# Patient Record
Sex: FEMALE | Race: WHITE | URBAN - METROPOLITAN AREA
[De-identification: names, ages, dates, MRNs, and addresses within clinical notes are randomized per-mention and may not be internally consistent; named-entity substitution may affect disease eponyms.]

---

## 2017-11-15 LAB
ABO/RH: NORMAL
ANTIBODY SCREEN: NORMAL
HEPATITIS C ANTIBODY INTERPRETATION: NORMAL

## 2017-11-16 LAB
BASOPHILS ABSOLUTE: 0 K/UL (ref 0–0.2)
BASOPHILS RELATIVE PERCENT: 0.2 %
EOSINOPHILS ABSOLUTE: 0.1 K/UL (ref 0–0.6)
EOSINOPHILS RELATIVE PERCENT: 1 %
HCT VFR BLD CALC: 41.5 % (ref 36–48)
HEMOGLOBIN: 13.8 G/DL (ref 12–16)
HEPATITIS B SURFACE ANTIGEN INTERPRETATION: ABNORMAL
HIV-1 AND HIV-2 ANTIBODIES: NORMAL
LYMPHOCYTES ABSOLUTE: 2.3 K/UL (ref 1–5.1)
LYMPHOCYTES RELATIVE PERCENT: 18.9 %
MCH RBC QN AUTO: 29.8 PG (ref 26–34)
MCHC RBC AUTO-ENTMCNC: 33.2 G/DL (ref 31–36)
MCV RBC AUTO: 89.7 FL (ref 80–100)
MONOCYTES ABSOLUTE: 0.6 K/UL (ref 0–1.3)
MONOCYTES RELATIVE PERCENT: 4.9 %
NEUTROPHILS ABSOLUTE: 9.1 K/UL (ref 1.7–7.7)
NEUTROPHILS RELATIVE PERCENT: 75 %
PDW BLD-RTO: 12.9 % (ref 12.4–15.4)
PLATELET # BLD: 228 K/UL (ref 135–450)
PMV BLD AUTO: 8.5 FL (ref 5–10.5)
RBC # BLD: 4.63 M/UL (ref 4–5.2)
RPR: ABNORMAL
RUBELLA ANTIBODY IGG: 6.6 IU/ML
WBC # BLD: 12.1 K/UL (ref 4–11)

## 2017-11-17 LAB — PARVOVIRUS B19 IGG ANTIBODY: 4.75 IV

## 2018-02-13 LAB
GLUCOSE CHALLENGE: 93 MG/DL
HCT VFR BLD CALC: 34.6 % (ref 36–48)
HEMOGLOBIN: 11.9 G/DL (ref 12–16)
MCH RBC QN AUTO: 30.2 PG (ref 26–34)
MCHC RBC AUTO-ENTMCNC: 34.4 G/DL (ref 31–36)
MCV RBC AUTO: 87.9 FL (ref 80–100)
PDW BLD-RTO: 14.1 % (ref 12.4–15.4)
PLATELET # BLD: 186 K/UL (ref 135–450)
PMV BLD AUTO: 7.9 FL (ref 5–10.5)
RBC # BLD: 3.94 M/UL (ref 4–5.2)
WBC # BLD: 11.1 K/UL (ref 4–11)

## 2018-05-30 PROBLEM — O34.219 PREVIOUS CESAREAN DELIVERY AFFECTING PREGNANCY: Status: ACTIVE | Noted: 2018-05-30

## 2019-04-03 ENCOUNTER — TELEPHONE (OUTPATIENT)
Dept: DERMATOLOGY | Age: 34
End: 2019-04-03

## 2019-04-03 NOTE — TELEPHONE ENCOUNTER
The patient is calling for a new pt appt with Dr. Korin Sanchez. She was offered an appointment with an associate, Dr. Jeny Early or Dr. Simon Casey. She declined. Her parents in law are pts of Dr. Korin Sanchez and they consider themselves all in the same family and should be able to be scheduled. Shawn and Anh Garcia are her family members. I advised there are no new patient appointments readily available at this time but she still wanted me to check with Dr. Korin Sanchez to see if she chould be seen? She wants to be established and get a skin exam.  She also is calling on behalf of getting her  scheduled too. Best call back number [phone].

## 2019-04-04 NOTE — TELEPHONE ENCOUNTER
Please offer them 5/20/19 @ 8:30am for one of them and 6/18/19 @ 8:30am for the other. Both appts are for Dr. Sergio Jennings.

## 2019-04-18 NOTE — TELEPHONE ENCOUNTER
I left a message for the patient, Samuella Heimlich, to call me back and I will schedule her and her  with Dr. Leif Soriano.

## 2019-05-20 ENCOUNTER — OFFICE VISIT (OUTPATIENT)
Dept: DERMATOLOGY | Age: 34
End: 2019-05-20
Payer: COMMERCIAL

## 2019-05-20 DIAGNOSIS — L71.9 ROSACEA: ICD-10-CM

## 2019-05-20 DIAGNOSIS — D22.9 MULTIPLE NEVI: Primary | ICD-10-CM

## 2019-05-20 DIAGNOSIS — D23.72 DERMATOFIBROMA OF LEFT LOWER EXTREMITY: ICD-10-CM

## 2019-05-20 PROCEDURE — 99213 OFFICE O/P EST LOW 20 MIN: CPT | Performed by: DERMATOLOGY

## 2019-05-20 NOTE — PROGRESS NOTES
[facility]  Aurora Yanez MD  [address]  1985    27 y.o. female     Date of Visit: 2019    Chief Complaint: skin moles. History of Present Illness:    1. She presents today for multiple nevi on the trunk and extremities - not aware of any changes in size, color or shape. 2.  Unknown duration of waxing and waning facial redness. 3.  She complains of a sometimes pruritic lesion on the left leg. Has fair skin. Had sunburns growing up. Did tanning bed in HS. Review of Systems:  Skin: No rash. Past Medical History, Family History, Surgical History, Medications and Allergies reviewed. 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 Physical Examination       The following were examined and determined to be normal: Psych/Neuro, Scalp/hair, Conjunctivae/eyelids, Gums/teeth/lips, Neck, Breast/axilla/chest, Abdomen, Back, RUE, LUE, RLE, LLE and Nails/digits. The following were examined and determined to be abnormal: Head/face. Well appearing. 1.  Trunk and extremities with multiple well defined round to oval smooth brown macules and papules. 2.  Central face, chin - mild erythema and several telangiectasias. 3.  Left lower shin - round indurated pink brown papule. Assessment and Plan     1.  Multiple nevi - benign appearing    Nikko Kane protective behaviors and self skin examinations were encouraged. Call for any new or concerning lesions. 2. Rosacea - mild erythematotelangiectatic     Continue sun protection. 3. Dermatofibroma of left lower leg -     Reassurance. Return in about 1 year (around 5/20/2020).

## 2019-10-30 LAB
CANDIDA SPECIES, DNA PROBE: ABNORMAL
GARDNERELLA VAGINALIS, DNA PROBE: ABNORMAL
TRICHOMONAS VAGINALIS DNA: ABNORMAL

## 2019-11-01 LAB
ORGANISM: ABNORMAL
URINE CULTURE, ROUTINE: ABNORMAL

## 2020-01-23 ENCOUNTER — NURSE TRIAGE (OUTPATIENT)
Dept: OTHER | Facility: CLINIC | Age: 35
End: 2020-01-23

## 2020-01-24 LAB
A/G RATIO: 1.5 (ref 1.1–2.2)
ALBUMIN SERPL-MCNC: 4.5 G/DL (ref 3.4–5)
ALP BLD-CCNC: 52 U/L (ref 40–129)
ALT SERPL-CCNC: 19 U/L (ref 10–40)
ANION GAP SERPL CALCULATED.3IONS-SCNC: 15 MMOL/L (ref 3–16)
AST SERPL-CCNC: 18 U/L (ref 15–37)
BILIRUB SERPL-MCNC: 0.5 MG/DL (ref 0–1)
BUN BLDV-MCNC: 11 MG/DL (ref 7–20)
CALCIUM SERPL-MCNC: 9.1 MG/DL (ref 8.3–10.6)
CHLORIDE BLD-SCNC: 104 MMOL/L (ref 99–110)
CHOLESTEROL, TOTAL: 149 MG/DL (ref 0–199)
CO2: 22 MMOL/L (ref 21–32)
CREAT SERPL-MCNC: 0.7 MG/DL (ref 0.6–1.1)
GFR AFRICAN AMERICAN: >60
GFR NON-AFRICAN AMERICAN: >60
GLOBULIN: 3 G/DL
GLUCOSE BLD-MCNC: 83 MG/DL (ref 70–99)
HCT VFR BLD CALC: 42.5 % (ref 36–48)
HDLC SERPL-MCNC: 76 MG/DL (ref 40–60)
HEMOGLOBIN: 13.5 G/DL (ref 12–16)
LDL CHOLESTEROL CALCULATED: 63 MG/DL
MCH RBC QN AUTO: 28.1 PG (ref 26–34)
MCHC RBC AUTO-ENTMCNC: 31.8 G/DL (ref 31–36)
MCV RBC AUTO: 88.2 FL (ref 80–100)
PDW BLD-RTO: 14.2 % (ref 12.4–15.4)
PLATELET # BLD: 222 K/UL (ref 135–450)
PMV BLD AUTO: 9.5 FL (ref 5–10.5)
POTASSIUM SERPL-SCNC: 4.3 MMOL/L (ref 3.5–5.1)
RBC # BLD: 4.82 M/UL (ref 4–5.2)
SODIUM BLD-SCNC: 141 MMOL/L (ref 136–145)
TOTAL PROTEIN: 7.5 G/DL (ref 6.4–8.2)
TRIGL SERPL-MCNC: 48 MG/DL (ref 0–150)
TSH SERPL DL<=0.05 MIU/L-ACNC: 1.9 UIU/ML (ref 0.27–4.2)
VITAMIN D 25-HYDROXY: 31 NG/ML
VLDLC SERPL CALC-MCNC: 10 MG/DL
WBC # BLD: 4.7 K/UL (ref 4–11)

## 2020-01-25 LAB
ESTIMATED AVERAGE GLUCOSE: 96.8 MG/DL
HBA1C MFR BLD: 5 %

## 2020-01-29 ENCOUNTER — OFFICE VISIT (OUTPATIENT)
Dept: FAMILY MEDICINE CLINIC | Age: 35
End: 2020-01-29
Payer: COMMERCIAL

## 2020-01-29 VITALS
WEIGHT: 157 LBS | DIASTOLIC BLOOD PRESSURE: 82 MMHG | BODY MASS INDEX: 26.16 KG/M2 | HEIGHT: 65 IN | OXYGEN SATURATION: 98 % | SYSTOLIC BLOOD PRESSURE: 128 MMHG | HEART RATE: 80 BPM

## 2020-01-29 PROCEDURE — 90471 IMMUNIZATION ADMIN: CPT | Performed by: NURSE PRACTITIONER

## 2020-01-29 PROCEDURE — 99385 PREV VISIT NEW AGE 18-39: CPT | Performed by: NURSE PRACTITIONER

## 2020-01-29 PROCEDURE — 90686 IIV4 VACC NO PRSV 0.5 ML IM: CPT | Performed by: NURSE PRACTITIONER

## 2020-01-29 ASSESSMENT — ENCOUNTER SYMPTOMS
NAUSEA: 0
BACK PAIN: 0
CHEST TIGHTNESS: 0
VOMITING: 0
BLOOD IN STOOL: 0
ABDOMINAL PAIN: 0
DIARRHEA: 0
CONSTIPATION: 0
WHEEZING: 0
SHORTNESS OF BREATH: 0
COLOR CHANGE: 0

## 2020-01-29 NOTE — PATIENT INSTRUCTIONS
possible drug interactions are listed here. Where can I get more information? Your doctor or pharmacist can provide more information about this vaccine. Additional information is available from your local health department or the Centers for Disease Control and Prevention. Remember, keep this and all other medicines out of the reach of children, never share your medicines with others, and use this medication only for the indication prescribed. Every effort has been made to ensure that the information provided by [facility]  is accurate, up-to-date, and complete, but no guarantee is made to that effect. Drug information contained herein may be time sensitive. [facility] information has been compiled for use by healthcare practitioners and consumers in the [place] and therefore [facility] does not warrant that uses outside of the [place] are appropriate, unless specifically indicated otherwise. [facility]'s drug information does not endorse drugs, diagnose patients or recommend therapy. [facility] drug information is an informational resource designed to assist licensed healthcare practitioners in caring for their patients and/or to serve consumers viewing this service as a supplement to, and not a substitute for, the expertise, skill, knowledge and judgment of healthcare practitioners. The absence of a warning for a given drug or drug combination in no way should be construed to indicate that the drug or drug combination is safe, effective or appropriate for any given patient. [facility] does not assume any responsibility for any aspect of healthcare administered with the aid of information [facility] provides. The information contained herein is not intended to cover all possible uses, directions, precautions, warnings, drug interactions, allergic reactions, or adverse effects.  If you have questions about the drugs you are taking, check with your doctor, nurse or pharmacist.  Copyright 5570-5080 [employer] Version: 7.14. Revision date: 7/10/2019. Care instructions adapted under license by [facility] ([facility]). If you have questions about a medical condition or this instruction, always ask your healthcare professional. [address] any warranty or liability for your use of this information.

## 2020-01-29 NOTE — PROGRESS NOTES
Trino Henao  : 1985  Encounter date: 2020    This sebastian 27 y.o. female who presents with  Chief Complaint   Patient presents with    Other     New patient to establish. Would like to discuss some toe numbness. History of present illness:    HPI Pt is 27year old female to establish care and preventative exam.  Pt denies current concerns. Pt received blood study at GYN office, reviewed labs with patient. History and Physical      Trino Henao  YOB: 1985    Date of Service:  2020    Chief Complaint:   Trino Henao is a 27 y.o. female who  presents for physical examination. HPI: Pt is 27year old female for preventative care.     Wt Readings from Last 3 Encounters:   20 157 lb (71.2 kg)   18 170 lb (77.1 kg)   12/04/15 144 lb 6 oz (65.5 kg)     BP Readings from Last 3 Encounters:   20 128/82   18 123/77   12/04/15 124/84       Patient Active Problem List   Diagnosis    Breech presentation    Previous  delivery affecting pregnancy       Preventive Care:  Health Maintenance   Topic Date Due    Varicella Vaccine (1 of 2 - 2-dose childhood series) 1985    Flu vaccine (1) 2019    Cervical cancer screen  07/10/2023    DTaP/Tdap/Td vaccine (2 - Td) 2028    HIV screen  Completed    Pneumococcal 0-64 years Vaccine  Aged Out      Hx abnormal PAP: no  Sexual activity: single partner, no contraception  Self-breast exams: no, advised  Previous DEXA scan: no  Last eye exam: 2019  Exercise: 3 x times week, cardio  Lipid panel:   Lab Results   Component Value Date    CHOL 149 2020    TRIG 48 2020    HDL 76 (H) 2020    LDLCALC 63 2020        Living will:No    Immunization History   Administered Date(s) Administered    Influenza Virus Vaccine 10/11/2013    MMR 2018    Tdap (Boostrix, Adacel) 2018       No Known Allergies  No outpatient medications have been marked as taking for the 20 encounter (Office Visit) with SHEEBA Guillermo NP. Past Medical History:   Diagnosis Date    Infertility, female      Past Surgical History:   Procedure Laterality Date     SECTION, LOW TRANSVERSE  2014    WISDOM TOOTH EXTRACTION       Family History   Problem Relation Age of Onset    Early Death Father     Cancer Maternal Grandmother     Cancer Maternal Grandfather     Cancer Paternal Grandfather      Social History     Socioeconomic History    Marital status:      Spouse name: Not on file    Number of children: Not on file    Years of education: Not on file    Highest education level: Not on file   Occupational History    Not on file   Social Needs    Financial resource strain: Not on file    Food insecurity:     Worry: Not on file     Inability: Not on file    Transportation needs:     Medical: Not on file     Non-medical: Not on file   Tobacco Use    Smoking status: Never Smoker    Smokeless tobacco: Never Used   Substance and Sexual Activity    Alcohol use: No    Drug use: No    Sexual activity: Not on file   Lifestyle    Physical activity:     Days per week: Not on file     Minutes per session: Not on file    Stress: Not on file   Relationships    Social connections:     Talks on phone: Not on file     Gets together: Not on file     Attends [employer] service: Not on file     Active member of club or organization: Not on file     Attends meetings of clubs or organizations: Not on file     Relationship status: Not on file    Intimate partner violence:     Fear of current or ex partner: Not on file     Emotionally abused: Not on file     Physically abused: Not on file     Forced sexual activity: Not on file   Other Topics Concern    Not on file   Social History Narrative    Not on file       Review of Systems:  A comprehensive review of systems was negative.       Physical Exam:   Vitals:    20 0911 20 0937   BP: 138/82 128/82 Site: Right Upper Arm    Position: Sitting    Cuff Size: Medium Adult    Pulse: 80    SpO2: 98%    Weight: 157 lb (71.2 kg)    Height: 5' 5\" (1.651 m)      Body mass index is 26.13 kg/m². Constitutional: She is oriented to person, place, and time. She appears well-developed and well-nourished. No distress. HEENT:   Head: Normocephalic and atraumatic. Right Ear: Tympanic membrane, external ear and ear canal normal.   Left Ear: Tympanic membrane, external ear and ear canal normal.   Nose: Nose normal.   Mouth/Throat: Oropharynx is clear and moist, and mucous membranes are normal.  There is no cervical adenopathy. Eyes: Conjunctivae and extraocular motions are normal. Pupils are equal, round, and reactive to light. Neck: Neck supple. No JVD present. Carotid bruit is not present. No mass and no thyromegaly present. Cardiovascular: Normal rate, regular rhythm, normal heart sounds and intact distal pulses. Exam reveals no gallop and no friction rub. No murmur heard. Pulmonary/Chest: Effort normal and breath sounds normal. No respiratory distress. She has no wheezes, rhonchi or rales. Abdominal: Soft, non-tender. Bowel sounds and aorta are normal. She exhibits no organomegaly, mass or bruit. Breast exam:  Not examined. Musculoskeletal: Normal range of motion, no synovitis. She exhibits no edema. Neurological: She is alert and oriented to person, place, and time. She has normal reflexes. No cranial nerve deficit. Coordination normal.   Skin: Skin is warm and dry. There is no rash or erythema. No suspicious lesions noted. Psychiatric: She has a normal mood and affect. Her speech is normal and behavior is normal. Judgment, cognition and memory are normal.     Assessment/Plan:    Margaret was seen today for other.     Diagnoses and all orders for this visit:    Encounter for preventative adult health care examination    Need for prophylactic vaccination and inoculation against influenza  - INFLUENZA, QUADV, 3 YRS AND OLDER, IM PF, PREFILL SYR OR SDV, 0.5ML (AFLURIA QUADV, PF)          No current outpatient medications on file prior to visit. No current facility-administered medications on file prior to visit. No Known Allergies  Past Medical History:   Diagnosis Date    Infertility, female       Past Surgical History:   Procedure Laterality Date     SECTION, LOW TRANSVERSE  2014    WISDOM TOOTH EXTRACTION        Family History   Problem Relation Age of Onset    Early Death Father     Cancer Maternal Grandmother     Cancer Maternal Grandfather     Cancer Paternal Grandfather       Social History     Tobacco Use    Smoking status: Never Smoker    Smokeless tobacco: Never Used   Substance Use Topics    Alcohol use: No        Review of Systems   Constitutional: Negative for activity change, appetite change, fatigue and unexpected weight change. HENT: Negative for dental problem, hearing loss and tinnitus. Eyes: Negative for visual disturbance. Respiratory: Negative for chest tightness, shortness of breath and wheezing. Cardiovascular: Negative for chest pain, palpitations and leg swelling. Gastrointestinal: Negative for abdominal pain, blood in stool, constipation, diarrhea, nausea and vomiting. Endocrine: Negative for cold intolerance, heat intolerance, polydipsia, polyphagia and polyuria. Genitourinary: Negative for decreased urine volume, difficulty urinating and frequency. Musculoskeletal: Negative for arthralgias, back pain and myalgias. Skin: Negative for color change and rash. Allergic/Immunologic: Negative for environmental allergies and food allergies. Neurological: Positive for numbness (L great toe, chronic). Negative for seizures, weakness and headaches. Hematological: Does not bruise/bleed easily. Psychiatric/Behavioral: Negative for decreased concentration, dysphoric mood and sleep disturbance. The patient is not nervous/anxious. Objective:    /82   Pulse 80   Ht 5' 5\" (1.651 m)   Wt 157 lb (71.2 kg)   SpO2 98%   BMI 26.13 kg/m²   Weight: 157 lb (71.2 kg)     BP Readings from Last 3 Encounters:   01/29/20 128/82   06/01/18 123/77   12/04/15 124/84     Wt Readings from Last 3 Encounters:   01/29/20 157 lb (71.2 kg)   05/30/18 170 lb (77.1 kg)   12/04/15 144 lb 6 oz (65.5 kg)     BMI Readings from Last 3 Encounters:   01/29/20 26.13 kg/m²   05/30/18 28.29 kg/m²   12/04/15 24.03 kg/m²       Physical Exam    Assessment/Plan    1. Encounter for preventative adult health care examination  Advised healthy eating and exercise  Discussed stretches    2. Need for prophylactic vaccination and inoculation against influenza  Administered  - INFLUENZA, QUADV, 3 YRS AND OLDER, IM PF, PREFILL SYR OR SDV, 0.5ML (MARLINE Chi)      Return in about 1 year (around 1/29/2021) for annual check up. This dictation was generated by voice recognition computer software. Although all attempts are made to edit the dictation for accuracy, there may be errors in the transcription that are not intended.

## 2020-01-29 NOTE — PROGRESS NOTES
Vaccine Information Sheet, \"Influenza - Inactivated\"  given to Nataliia Jackson, or parent/legal guardian of  Nataliia Jackson and verbalized understanding. Patient responses:    Have you ever had a reaction to a flu vaccine? No  Do you have any current illness? No  Have you ever had Guillian [place] Syndrome? No  Do you have a serious allergy to any of the follow: Neomycin, Polymyxin, Thimerosal, eggs or egg products? No    Flu vaccine given per order. Please see immunization tab. Risks and benefits explained. Current VIS given.

## 2021-03-23 ENCOUNTER — OFFICE VISIT (OUTPATIENT)
Dept: DERMATOLOGY | Age: 36
End: 2021-03-23
Payer: COMMERCIAL

## 2021-03-23 VITALS — TEMPERATURE: 98 F

## 2021-03-23 DIAGNOSIS — D22.9 MULTIPLE NEVI: Primary | ICD-10-CM

## 2021-03-23 DIAGNOSIS — D48.5 NEOPLASM OF UNCERTAIN BEHAVIOR OF SKIN: ICD-10-CM

## 2021-03-23 DIAGNOSIS — L30.9 DERMATITIS: ICD-10-CM

## 2021-03-23 DIAGNOSIS — L71.9 ROSACEA: ICD-10-CM

## 2021-03-23 DIAGNOSIS — L82.1 SK (SEBORRHEIC KERATOSIS): ICD-10-CM

## 2021-03-23 PROCEDURE — 11102 TANGNTL BX SKIN SINGLE LES: CPT | Performed by: DERMATOLOGY

## 2021-03-23 PROCEDURE — 99213 OFFICE O/P EST LOW 20 MIN: CPT | Performed by: DERMATOLOGY

## 2021-03-23 RX ORDER — TRIAMCINOLONE ACETONIDE 1 MG/G
CREAM TOPICAL
Qty: 30 G | Refills: 1 | Status: SHIPPED | OUTPATIENT
Start: 2021-03-23

## 2021-03-23 NOTE — PROGRESS NOTES
[facility]  Trini Manjarrez MD  [phone]      Disha Esquivel  1985    39 y.o. female     Date of Visit: 3/23/2021    Chief Complaint: skin moles, rash    History of Present Illness:    1. She presents today for evaluation of multiple moles on the trunk and extremitiesnot aware of any changes in size, color, or shape. 2.  Unknown duration of an atypical appearing mole on the left lateral back. 3.  She also complains of a couple of asymptomatic lesions on the left side of the abdomen and back. 4.  Follow-up for history of acne rosacearemains stable and asymptomatic. She denies the presence of any papules or pustules. 5.  She presents today for 2 month history of a pruritic eruption on the lower neck. Has tried lotion without improvement. Has fair skin. Had sunburns growing up. Did tanning bed in HS. Review of Systems:  Gen: Feels well, good sense of health. Past Medical History, Family History, Surgical History, Medications and Allergies reviewed. Past Medical History:   Diagnosis Date    Infertility, female      Past Surgical History:   Procedure Laterality Date     SECTION, LOW TRANSVERSE  2014    WISDOM TOOTH EXTRACTION         No Known Allergies  Outpatient Medications Marked as Taking for the 3/23/21 encounter (Office Visit) with Mylene Schwartz MD   Medication Sig Dispense Refill    triamcinolone (KENALOG) 0.1 % cream Apply to affected area on the neck twice daily for up to 2 weeks or until improved. 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 Physical Examination       The following were examined and determined to be normal: Psych/Neuro, Scalp/hair, Conjunctivae/eyelids, Gums/teeth/lips, Breast/axilla/chest, Abdomen, RUE, LUE, RLE, LLE and Nails/digits. The following were examined and determined to be abnormal: Head/face, Neck and Back. Well-appearing.     1.  Scattered on the trunk and extremities are numerous well-defined round oval uniformly brown macules and few papules. 2.  Left lateral back with a 1 cm oval-shaped variegated pink-brown thin papule. 3.  Back and left lower abdomen with 2 stuck on appearing verrucous tan papules. 4.  Central face with mild erythema and scattered telangiectasias. 5.  Right central lower anterior neck with an ill-defined scaly erythematous patch. Assessment and Plan     1. Multiple nevi - benign appearing    Sun protective behaviors, including use of at least SPF 30 sunscreen, and self skin examinations were encouraged. Call for any new or concerning lesions. 2. Neoplasm of uncertain behavior of skin, left lateral back -dysplastic nevus versus early melanoma    Discussed possible diagnosis; patient agreeable to biopsy (verbal consent obtained). The area(s) to be biopsied were marked with a surgical pen. Alcohol was used to cleanse the site. Local anesthesia was acheived with 1% lidocaine with epinephrine. Shave biopsy was performed using a razor blade. Hemostasis was achieved with aluminum chloride. The wound(s) were dressed with petrolatum and covered with a bandage. Wound care instructions were reviewed. 1 Specimen (s) sent to pathology. The specimen bottles were appropriately labeled. We also reviewed the risks of bleeding, scar, and infection. 3. SK (seborrheic keratosis)     Reassurance. 4. Rosacea, erythematotelangiectatic - mild    Observe. 5. Dermatitis - mild, new onset    Triamcinolone 0.1% cream twice daily for up to 2 weeks or until improved. Return in about 1 year (around 3/23/2022).     --Sixto Garcia MD

## 2021-03-23 NOTE — PATIENT INSTRUCTIONS
Biopsy Wound Care Instructions    · Keep the bandage in place for 24 hours. · Cleanse the wound with mild soapy water daily   Gently dry the area.  Apply Vaseline or petroleum jelly to the wound using a cotton tipped applicator.  Cover with a clean bandage.  Repeat this process until the biopsy site is healed.  If you had stitches placed, continue treating the site until the stitches are removed. Remember to make an appointment to return to have your stitches removed by our staff.  You may shower and bathe as usual.       ** Biopsy results generally take around 7 business days to come back. If you have not heard from us by then, please call the office at [phone]. *Please note that biopsy results are released to both the patient and physician at the same time in [address] Please allow time for your physician to review the results. One of our staff members will reach out to you with the results and plan.

## 2021-03-25 LAB — DERMATOLOGY PATHOLOGY REPORT: NORMAL

## 2021-10-27 LAB
HPV COMMENT: NORMAL
HPV TYPE 16: NOT DETECTED
HPV TYPE 18: NOT DETECTED
HPVOH (OTHER TYPES): NOT DETECTED

## 2021-11-13 LAB
A/G RATIO: 1.7 (ref 1.1–2.2)
ALBUMIN SERPL-MCNC: 4.3 G/DL (ref 3.4–5)
ALP BLD-CCNC: 73 U/L (ref 40–129)
ALT SERPL-CCNC: 12 U/L (ref 10–40)
ANION GAP SERPL CALCULATED.3IONS-SCNC: 15 MMOL/L (ref 3–16)
AST SERPL-CCNC: 16 U/L (ref 15–37)
BILIRUB SERPL-MCNC: 0.3 MG/DL (ref 0–1)
BUN BLDV-MCNC: 9 MG/DL (ref 7–20)
CALCIUM SERPL-MCNC: 9 MG/DL (ref 8.3–10.6)
CHLORIDE BLD-SCNC: 105 MMOL/L (ref 99–110)
CHOLESTEROL, TOTAL: 134 MG/DL (ref 0–199)
CO2: 21 MMOL/L (ref 21–32)
CREAT SERPL-MCNC: 0.8 MG/DL (ref 0.6–1.1)
ESTIMATED AVERAGE GLUCOSE: 105.4 MG/DL
GFR AFRICAN AMERICAN: >60
GFR NON-AFRICAN AMERICAN: >60
GLUCOSE BLD-MCNC: 92 MG/DL (ref 70–99)
HBA1C MFR BLD: 5.3 %
HDLC SERPL-MCNC: 63 MG/DL (ref 40–60)
LDL CHOLESTEROL CALCULATED: 62 MG/DL
POTASSIUM SERPL-SCNC: 4.4 MMOL/L (ref 3.5–5.1)
SODIUM BLD-SCNC: 141 MMOL/L (ref 136–145)
TOTAL PROTEIN: 6.9 G/DL (ref 6.4–8.2)
TRIGL SERPL-MCNC: 43 MG/DL (ref 0–150)
TSH SERPL DL<=0.05 MIU/L-ACNC: 1.37 UIU/ML (ref 0.27–4.2)
VITAMIN D 25-HYDROXY: 43.3 NG/ML
VLDLC SERPL CALC-MCNC: 9 MG/DL

## 2021-11-14 LAB
HCT VFR BLD CALC: 34.2 % (ref 36–48)
HEMATOLOGY PATH CONSULT: YES
HEMOGLOBIN: 10.5 G/DL (ref 12–16)
MCH RBC QN AUTO: 20.9 PG (ref 26–34)
MCHC RBC AUTO-ENTMCNC: 30.6 G/DL (ref 31–36)
MCV RBC AUTO: 68.3 FL (ref 80–100)
PDW BLD-RTO: 18.9 % (ref 12.4–15.4)
PLATELET # BLD: 285 K/UL (ref 135–450)
PMV BLD AUTO: 8.9 FL (ref 5–10.5)
RBC # BLD: 5.01 M/UL (ref 4–5.2)
WBC # BLD: 5.6 K/UL (ref 4–11)

## 2021-11-15 LAB — HEMATOLOGY PATH CONSULT: NORMAL

## 2022-03-23 ENCOUNTER — OFFICE VISIT (OUTPATIENT)
Dept: DERMATOLOGY | Age: 37
End: 2022-03-23
Payer: COMMERCIAL

## 2022-03-23 VITALS — TEMPERATURE: 97.3 F

## 2022-03-23 DIAGNOSIS — L82.1 SK (SEBORRHEIC KERATOSIS): ICD-10-CM

## 2022-03-23 DIAGNOSIS — L72.0 EPIDERMOID CYST: Primary | ICD-10-CM

## 2022-03-23 DIAGNOSIS — D22.9 MULTIPLE NEVI: ICD-10-CM

## 2022-03-23 DIAGNOSIS — L71.9 ROSACEA: ICD-10-CM

## 2022-03-23 PROCEDURE — 99213 OFFICE O/P EST LOW 20 MIN: CPT | Performed by: DERMATOLOGY

## 2022-03-23 NOTE — PROGRESS NOTES
[facility]  Kiara Alexandre MD  [phone]      Alea Liu  1985    44 y.o. female     Date of Visit: 3/23/2022    Chief Complaint: skin moles, skin lesion    History of Present Illness:    1. She reports a longstanding lesion on the right upper back that has been stable in size. It is not painful. 2.  She has a couple of asymptomatic growths on the central back. 3.  She has multiple moles on the trunk and extremitiesnot aware of any changes in size, color, or shape. 4.  She has a history of erythematotelangiectatic rosacearemains stable. Denies the presence of any papules or pustules. Has a history of a dysplastic nevus with moderate dysplasia on the left lateral back that was completely removed with biopsy. Has fair skin. Had sunburns growing up. Did tanning bed in HS. Review of Systems:  Gen: Feels well, good sense of health. Past Medical History, Family History, Surgical History, Medications and Allergies reviewed. Past Medical History:   Diagnosis Date    Infertility, female      Past Surgical History:   Procedure Laterality Date     SECTION, LOW TRANSVERSE  2014    WISDOM TOOTH EXTRACTION         No Known Allergies  Outpatient Medications Marked as Taking for the 3/23/22 encounter (Office Visit) with Brown Hay MD   Medication Sig Dispense Refill    triamcinolone (KENALOG) 0.1 % cream Apply to affected area on the neck twice daily for up to 2 weeks or until improved. 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 The following were examined and determined to be abnormal: Head/face. Well appearing.     1.  Right upper back with about a 1 cm round subcutaneous nodule. 2.  Mid back with a couple of stuck on appearing verrucous tan papules. 3.  Trunk and extremities with multiple well-defined round of uniformly brown macules and few papules. 4.  Cheeks and chin with mild to moderate erythema and several telangiectasias. No papules or pustules visible today. Assessment and Plan     1. Epidermoid cyst     Reassurance. Consider excision if enlarges or becomes bothersome. 2. SK (seborrheic keratosis)     Reassurance. 3. Multiple nevi - benign appearing    Sun protective behaviors, including use of at least SPF 30 sunscreen, and self skin examinations were encouraged. Call for any new or concerning lesions. 4. Rosacea - erythematotelangiectatic     Monitor for the presence of any papules or pustules. Return in about 1 year (around 3/23/2023).     --Zachary Valentino MD

## 2024-04-10 ENCOUNTER — TELEPHONE (OUTPATIENT)
Dept: DERMATOLOGY | Age: 39
End: 2024-04-10

## 2024-04-10 NOTE — TELEPHONE ENCOUNTER
Pt has appt Monday, 4/15, at 9:30 for yearly. 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 If there is a sooner appt available, please call pt at [phone].    Otherwise, she will keep appt as is.

## 2024-04-30 ENCOUNTER — OFFICE VISIT (OUTPATIENT)
Dept: DERMATOLOGY | Age: 39
End: 2024-04-30
Payer: COMMERCIAL

## 2024-04-30 DIAGNOSIS — L82.1 SK (SEBORRHEIC KERATOSIS): ICD-10-CM

## 2024-04-30 DIAGNOSIS — D48.5 NEOPLASM OF UNCERTAIN BEHAVIOR OF SKIN: ICD-10-CM

## 2024-04-30 DIAGNOSIS — D22.9 MULTIPLE NEVI: ICD-10-CM

## 2024-04-30 DIAGNOSIS — L71.9 ROSACEA: Primary | ICD-10-CM

## 2024-04-30 DIAGNOSIS — L72.0 EPIDERMOID CYST: ICD-10-CM

## 2024-04-30 PROCEDURE — 11102 TANGNTL BX SKIN SINGLE LES: CPT | Performed by: DERMATOLOGY

## 2024-04-30 PROCEDURE — 99213 OFFICE O/P EST LOW 20 MIN: CPT | Performed by: DERMATOLOGY

## 2024-04-30 RX ORDER — BRIMONIDINE 5 MG/G
GEL TOPICAL
Qty: 30 G | Refills: 2 | Status: SHIPPED | OUTPATIENT
Start: 2024-04-30 | End: 2024-05-02 | Stop reason: ALTCHOICE

## 2024-04-30 NOTE — PROGRESS NOTES
[facility]ermat[facility]  Ethan Flores MD  [phone]      Arina Cruz  1985    39 y.o. female     Date of Visit: 2024    Chief Complaint: skin moles, rosacea    History of Present Illness:    1.  She returns today for history of erythematotelangiectatic rosacea-reports persistence of redness on the face.  She is interested in treatment options.    2.  She has a couple of asymptomatic growths on the left cheek and also on the back.    3.  She has multiple moles on the trunk and extremities-not aware of any changes in size, color, or shape.    4.  Unknown duration of an asymptomatic atypical appearing mole on the right side of the abdomen.    5.  She also has a longstanding cyst on the right upper back-remains asymptomatic and not bothersome.    Dermatologic history:     Has a history of a dysplastic nevus with moderate dysplasia on the left lateral back that was completely removed with biopsy.      Has fair skin.   Had sunburns growing up.   Did tanning bed in HS.    Review of Systems:  Gen: Feels well, good sense of health.      Past Medical History, Family History, Surgical History, Medications and Allergies reviewed.    Past Medical History:   Diagnosis Date    Infertility, female      Past Surgical History:   Procedure Laterality Date     SECTION, LOW TRANSVERSE  2014    WISDOM TOOTH EXTRACTION         No Known Allergies  Outpatient Medications Marked as Taking for the 24 encounter (Office Visit) with Ethan Flores MD   Medication Sig Dispense Refill    Brimonidine Tartrate 0.33 % GEL Apply to the face daily for rosacea. 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      Has 2 kids that go to [employer].      Physical Examination       The following were examined and determined to be normal: Psych/Neuro, Scalp/hair, Conjunctivae/eyelids, Gums/teeth/lips, Neck, Breast/axilla/chest, Back, RUE, LUE, RLE, LLE, and Nails/digits.    The

## 2024-04-30 NOTE — PATIENT INSTRUCTIONS
Biopsy Wound Care Instructions    Keep the bandage in place for 24 hours.   Cleanse the wound with mild soapy water daily  Gently dry the area.  Apply Vaseline or petroleum jelly to the wound using a cotton tipped applicator.  Cover with a clean bandage.  Repeat this process until the biopsy site is healed.  If you had stitches placed, continue treating the site until the stitches are removed. Remember to make an appointment to return to have your stitches removed by our staff.  You may shower and bathe as usual.       ** Biopsy results generally take around 7 business days to come back.  If you have not heard from us by then, please call the office at [phone].    *Please note that biopsy results are released to both the patient and physician at the same time in [employer].  Please allow time for your physician to review the results.  One of our staff members will reach out to you with the results and plan.

## 2024-05-02 ENCOUNTER — OFFICE VISIT (OUTPATIENT)
Dept: FAMILY MEDICINE CLINIC | Age: 39
End: 2024-05-02
Payer: COMMERCIAL

## 2024-05-02 VITALS
TEMPERATURE: 97.4 F | OXYGEN SATURATION: 99 % | DIASTOLIC BLOOD PRESSURE: 74 MMHG | HEART RATE: 82 BPM | WEIGHT: 169 LBS | BODY MASS INDEX: 28.12 KG/M2 | SYSTOLIC BLOOD PRESSURE: 126 MMHG

## 2024-05-02 DIAGNOSIS — F41.8 SITUATIONAL ANXIETY: ICD-10-CM

## 2024-05-02 DIAGNOSIS — Z13.1 SCREENING FOR DIABETES MELLITUS: ICD-10-CM

## 2024-05-02 DIAGNOSIS — Z00.00 PREVENTATIVE HEALTH CARE: Primary | ICD-10-CM

## 2024-05-02 DIAGNOSIS — Z13.29 SCREENING FOR THYROID DISORDER: ICD-10-CM

## 2024-05-02 DIAGNOSIS — Z12.31 ENCOUNTER FOR SCREENING MAMMOGRAM FOR MALIGNANT NEOPLASM OF BREAST: ICD-10-CM

## 2024-05-02 DIAGNOSIS — Z13.220 SCREENING FOR CHOLESTEROL LEVEL: ICD-10-CM

## 2024-05-02 PROCEDURE — 99386 PREV VISIT NEW AGE 40-64: CPT | Performed by: NURSE PRACTITIONER

## 2024-05-02 RX ORDER — PROPRANOLOL HYDROCHLORIDE 10 MG/1
10 TABLET ORAL 2 TIMES DAILY PRN
Qty: 60 TABLET | Refills: 0 | Status: SHIPPED | OUTPATIENT
Start: 2024-05-02 | End: 2024-06-01

## 2024-05-02 SDOH — ECONOMIC STABILITY: FOOD INSECURITY: WITHIN THE PAST 12 MONTHS, THE FOOD YOU BOUGHT JUST DIDN'T LAST AND YOU DIDN'T HAVE MONEY TO GET MORE.: NEVER TRUE

## 2024-05-02 SDOH — ECONOMIC STABILITY: HOUSING INSECURITY
IN THE LAST 12 MONTHS, WAS THERE A TIME WHEN YOU DID NOT HAVE A STEADY PLACE TO SLEEP OR SLEPT IN A SHELTER (INCLUDING NOW)?: NO

## 2024-05-02 SDOH — ECONOMIC STABILITY: FOOD INSECURITY: WITHIN THE PAST 12 MONTHS, YOU WORRIED THAT YOUR FOOD WOULD RUN OUT BEFORE YOU GOT MONEY TO BUY MORE.: NEVER TRUE

## 2024-05-02 SDOH — ECONOMIC STABILITY: INCOME INSECURITY: HOW HARD IS IT FOR YOU TO PAY FOR THE VERY BASICS LIKE FOOD, HOUSING, MEDICAL CARE, AND HEATING?: NOT HARD AT ALL

## 2024-05-02 ASSESSMENT — PATIENT HEALTH QUESTIONNAIRE - PHQ9
SUM OF ALL RESPONSES TO PHQ QUESTIONS 1-9: 0
SUM OF ALL RESPONSES TO PHQ9 QUESTIONS 1 & 2: 0
1. LITTLE INTEREST OR PLEASURE IN DOING THINGS: NOT AT ALL
2. FEELING DOWN, DEPRESSED OR HOPELESS: NOT AT ALL
SUM OF ALL RESPONSES TO PHQ QUESTIONS 1-9: 0
2. FEELING DOWN, DEPRESSED OR HOPELESS: NOT AT ALL
SUM OF ALL RESPONSES TO PHQ QUESTIONS 1-9: 0
1. LITTLE INTEREST OR PLEASURE IN DOING THINGS: NOT AT ALL
SUM OF ALL RESPONSES TO PHQ9 QUESTIONS 1 & 2: 0
SUM OF ALL RESPONSES TO PHQ QUESTIONS 1-9: 0

## 2024-05-02 NOTE — PROGRESS NOTES
Arina Cruz  : 1985  Encounter date: 2024    This sebastian 39 y.o. female who presents with  Chief Complaint   Patient presents with    New Patient     Weight gain, anxiety concerns       History of present illness:    HPI History and Physical      Arina Cruz  YOB: 1985    Date of Service:  2024    Chief Complaint:   Arina Cruz is a 39 y.o. female who  presents for physical examination.    HPI: Pt is 39 year old female for annual exam, has not been seen since .  Due for labs.  PT established with Dr. Flores, dermatology, for rosacea.  Concerns regarding recent weight gain and anxiety.    Wt Readings from Last 3 Encounters:   24 76.7 kg (169 lb)   20 71.2 kg (157 lb)   18 77.1 kg (170 lb)     BP Readings from Last 3 Encounters:   24 126/74   20 128/82   18 123/77       Patient Active Problem List   Diagnosis    Breech presentation    Previous  delivery affecting pregnancy       Preventive Care:  Health Maintenance   Topic Date Due    Hepatitis B vaccine (1 of 3 - 3-dose series) Never done    Varicella vaccine (1 of 2 - 2-dose childhood series) Never done    COVID-19 Vaccine (2023- season) 2023    Flu vaccine (Season Ended) 2024    Depression Screen  2025    Cervical cancer screen  10/25/2026    Lipids  2026    DTaP/Tdap/Td vaccine (2 - Td or Tdap) 2028    Hepatitis C screen  Completed    HIV screen  Completed    Hepatitis A vaccine  Aged Out    Hib vaccine  Aged Out    HPV vaccine  Aged Out    Polio vaccine  Aged Out    Meningococcal (ACWY) vaccine  Aged Out    Pneumococcal 0-64 years Vaccine  Aged Out      Hx abnormal PAP: no  Sexual activity: single partner, contraception - none   Self-breast exams: yes  Previous DEXA scan: no  Last eye exam:  due  Exercise: cardio, weights 2-3 times  Lipid panel:   Lab Results   Component Value Date    CHOL 134 2021    TRIG 43 2021    HDL 63 (H)

## 2024-05-07 DIAGNOSIS — Z00.00 PREVENTATIVE HEALTH CARE: ICD-10-CM

## 2024-05-07 DIAGNOSIS — Z13.1 SCREENING FOR DIABETES MELLITUS: ICD-10-CM

## 2024-05-07 DIAGNOSIS — Z13.29 SCREENING FOR THYROID DISORDER: ICD-10-CM

## 2024-05-07 DIAGNOSIS — Z13.220 SCREENING FOR CHOLESTEROL LEVEL: ICD-10-CM

## 2024-05-07 LAB
ALBUMIN SERPL-MCNC: 4.4 G/DL (ref 3.4–5)
ALBUMIN/GLOB SERPL: 1.7 {RATIO} (ref 1.1–2.2)
ALP SERPL-CCNC: 68 U/L (ref 40–129)
ALT SERPL-CCNC: 13 U/L (ref 10–40)
ANION GAP SERPL CALCULATED.3IONS-SCNC: 11 MMOL/L (ref 3–16)
AST SERPL-CCNC: 15 U/L (ref 15–37)
BASOPHILS # BLD: 0 K/UL (ref 0–0.2)
BASOPHILS NFR BLD: 0.8 %
BILIRUB SERPL-MCNC: 0.4 MG/DL (ref 0–1)
BUN SERPL-MCNC: 14 MG/DL (ref 7–20)
CALCIUM SERPL-MCNC: 9.2 MG/DL (ref 8.3–10.6)
CHLORIDE SERPL-SCNC: 102 MMOL/L (ref 99–110)
CHOLEST SERPL-MCNC: 157 MG/DL (ref 0–199)
CO2 SERPL-SCNC: 25 MMOL/L (ref 21–32)
CREAT SERPL-MCNC: 0.7 MG/DL (ref 0.6–1.1)
DEPRECATED RDW RBC AUTO: 15.7 % (ref 12.4–15.4)
EOSINOPHIL # BLD: 0.1 K/UL (ref 0–0.6)
EOSINOPHIL NFR BLD: 2.3 %
GFR SERPLBLD CREATININE-BSD FMLA CKD-EPI: >90 ML/MIN/{1.73_M2}
GLUCOSE P FAST SERPL-MCNC: 99 MG/DL (ref 70–99)
HCT VFR BLD AUTO: 42 % (ref 36–48)
HDLC SERPL-MCNC: 67 MG/DL (ref 40–60)
HGB BLD-MCNC: 14.1 G/DL (ref 12–16)
LDL CHOLESTEROL: 74 MG/DL
LYMPHOCYTES # BLD: 1.6 K/UL (ref 1–5.1)
LYMPHOCYTES NFR BLD: 25.2 %
MCH RBC QN AUTO: 28.7 PG (ref 26–34)
MCHC RBC AUTO-ENTMCNC: 33.7 G/DL (ref 31–36)
MCV RBC AUTO: 85.4 FL (ref 80–100)
MONOCYTES # BLD: 0.5 K/UL (ref 0–1.3)
MONOCYTES NFR BLD: 8.3 %
NEUTROPHILS # BLD: 4.1 K/UL (ref 1.7–7.7)
NEUTROPHILS NFR BLD: 63.4 %
PLATELET # BLD AUTO: 221 K/UL (ref 135–450)
PMV BLD AUTO: 8.5 FL (ref 5–10.5)
POTASSIUM SERPL-SCNC: 4.2 MMOL/L (ref 3.5–5.1)
PROT SERPL-MCNC: 7 G/DL (ref 6.4–8.2)
RBC # BLD AUTO: 4.91 M/UL (ref 4–5.2)
SODIUM SERPL-SCNC: 138 MMOL/L (ref 136–145)
TRIGL SERPL-MCNC: 81 MG/DL (ref 0–150)
TSH SERPL DL<=0.005 MIU/L-ACNC: 3.13 UIU/ML (ref 0.27–4.2)
VLDLC SERPL CALC-MCNC: 16 MG/DL
WBC # BLD AUTO: 6.4 K/UL (ref 4–11)

## 2024-05-08 LAB
EST. AVERAGE GLUCOSE BLD GHB EST-MCNC: 93.9 MG/DL
HBA1C MFR BLD: 4.9 %

## 2024-05-31 DIAGNOSIS — F41.8 SITUATIONAL ANXIETY: ICD-10-CM

## 2024-05-31 RX ORDER — PROPRANOLOL HYDROCHLORIDE 10 MG/1
10 TABLET ORAL 2 TIMES DAILY PRN
Qty: 60 TABLET | Refills: 0 | Status: SHIPPED | OUTPATIENT
Start: 2024-05-31 | End: 2024-06-30

## 2024-06-05 ENCOUNTER — OFFICE VISIT (OUTPATIENT)
Dept: FAMILY MEDICINE CLINIC | Age: 39
End: 2024-06-05
Payer: COMMERCIAL

## 2024-06-05 VITALS
OXYGEN SATURATION: 98 % | WEIGHT: 167 LBS | DIASTOLIC BLOOD PRESSURE: 80 MMHG | TEMPERATURE: 97.5 F | SYSTOLIC BLOOD PRESSURE: 128 MMHG | BODY MASS INDEX: 27.82 KG/M2 | HEART RATE: 68 BPM | HEIGHT: 65 IN

## 2024-06-05 DIAGNOSIS — F41.8 SITUATIONAL ANXIETY: Primary | ICD-10-CM

## 2024-06-05 DIAGNOSIS — E66.3 OVERWEIGHT (BMI 25.0-29.9): ICD-10-CM

## 2024-06-05 PROCEDURE — 99214 OFFICE O/P EST MOD 30 MIN: CPT | Performed by: NURSE PRACTITIONER

## 2024-06-05 NOTE — PROGRESS NOTES
Arina Cruz  : 1985  Encounter date: 2024    This sebastian 39 y.o. female who presents with  Chief Complaint   Patient presents with    Follow-up       History of present illness:    HPI Pt is 39 year old female for concerns regarding situational anxiety, currently prescribed propranolol 10 mg BID.  Pt has not taken psychotropic medications in past.  Reports working well, only taking as needed.    Pt also concerned about recent weight gain.  Reviewed labs, normal.  Discussed weight loss options, benefits and risks with each.    Current Outpatient Medications on File Prior to Visit   Medication Sig Dispense Refill    propranolol (INDERAL) 10 MG tablet TAKE 1 TABLET BY MOUTH 2 TIMES DAILY AS NEEDED (SITUATIONAL ANXIETY) 60 tablet 0     No current facility-administered medications on file prior to visit.      No Known Allergies  Past Medical History:   Diagnosis Date    Infertility, female       Past Surgical History:   Procedure Laterality Date     SECTION, LOW TRANSVERSE  2014    WISDOM TOOTH EXTRACTION        Family History   Problem Relation Age of Onset    Early Death Father     Cancer Maternal Grandmother     Cancer Maternal Grandfather     Cancer Paternal Grandfather       Social History     Tobacco Use    Smoking status: Never    Smokeless tobacco: Never   Substance Use Topics    Alcohol use: No        Review of Systems    Objective:    /80   Pulse 68   Temp 97.5 °F (36.4 °C) (Temporal)   Ht 1.651 m (5' 5\")   Wt 75.8 kg (167 lb)   SpO2 98%   BMI 27.79 kg/m²   Weight - Scale: 75.8 kg (167 lb)     BP Readings from Last 3 Encounters:   24 128/80   24 126/74   20 128/82     Wt Readings from Last 3 Encounters:   24 75.8 kg (167 lb)   24 76.7 kg (169 lb)   20 71.2 kg (157 lb)     BMI Readings from Last 3 Encounters:   24 27.79 kg/m²   24 28.12 kg/m²   20 26.13 kg/m²       Physical Exam  Vitals reviewed.   Constitutional:

## 2025-04-14 ENCOUNTER — HOSPITAL ENCOUNTER (OUTPATIENT)
Dept: WOMENS IMAGING | Age: 40
Discharge: HOME | End: 2025-04-14
Payer: COMMERCIAL

## 2025-04-14 VITALS — WEIGHT: 155 LBS | HEIGHT: 65 IN | BODY MASS INDEX: 25.83 KG/M2

## 2025-04-14 DIAGNOSIS — Z12.31 ENCOUNTER FOR SCREENING MAMMOGRAM FOR MALIGNANT NEOPLASM OF BREAST: ICD-10-CM

## 2025-04-14 PROCEDURE — 77063 BREAST TOMOSYNTHESIS BI: CPT

## 2025-04-16 ENCOUNTER — TELEPHONE (OUTPATIENT)
Dept: WOMENS IMAGING | Age: 40
End: 2025-04-16

## 2025-04-17 ENCOUNTER — RESULTS FOLLOW-UP (OUTPATIENT)
Dept: FAMILY MEDICINE CLINIC | Age: 40
End: 2025-04-17

## 2025-04-17 DIAGNOSIS — R92.8 ABNORMAL MAMMOGRAM OF BOTH BREASTS: Primary | ICD-10-CM

## 2025-05-01 ENCOUNTER — RESULTS FOLLOW-UP (OUTPATIENT)
Dept: FAMILY MEDICINE CLINIC | Age: 40
End: 2025-05-01

## 2025-05-01 ENCOUNTER — HOSPITAL ENCOUNTER (OUTPATIENT)
Dept: ULTRASOUND IMAGING | Age: 40
Discharge: HOME | End: 2025-05-01
Payer: COMMERCIAL

## 2025-05-01 ENCOUNTER — HOSPITAL ENCOUNTER (OUTPATIENT)
Dept: WOMENS IMAGING | Age: 40
Discharge: HOME | End: 2025-05-01
Payer: COMMERCIAL

## 2025-05-01 DIAGNOSIS — R92.8 ABNORMAL MAMMOGRAM OF BOTH BREASTS: Primary | ICD-10-CM

## 2025-05-01 DIAGNOSIS — R92.8 ABNORMAL MAMMOGRAM OF BOTH BREASTS: ICD-10-CM

## 2025-05-01 PROCEDURE — G0279 TOMOSYNTHESIS, MAMMO: HCPCS

## 2025-05-01 PROCEDURE — 76642 ULTRASOUND BREAST LIMITED: CPT

## 2025-05-05 ENCOUNTER — OFFICE VISIT (OUTPATIENT)
Age: 40
End: 2025-05-05
Payer: COMMERCIAL

## 2025-05-05 DIAGNOSIS — L82.1 SK (SEBORRHEIC KERATOSIS): ICD-10-CM

## 2025-05-05 DIAGNOSIS — L71.9 ROSACEA: Primary | ICD-10-CM

## 2025-05-05 DIAGNOSIS — D22.9 MULTIPLE NEVI: ICD-10-CM

## 2025-05-05 PROCEDURE — 99213 OFFICE O/P EST LOW 20 MIN: CPT | Performed by: DERMATOLOGY

## 2025-05-05 RX ORDER — IVERMECTIN 10 MG/G
CREAM TOPICAL
Qty: 45 G | Refills: 2 | Status: SHIPPED | OUTPATIENT
Start: 2025-05-05

## 2025-05-05 NOTE — PROGRESS NOTES
[facility]  Ethan Flores MD  [phone]      Arina Cruz  1985    40 y.o. female     Date of Visit: 2025    Chief Complaint: rosacea, moles of concern.      History of Present Illness:    1.  Rosacea is worse - breaking out more.  Uses Eucerin redness relief with only mild improvement.     2.  She also presents today for evaluation of multiple moles - not aware of any changes in size, color or shape.       Dermatologic history:     Has a history of a dysplastic nevus with moderate dysplasia on the left lateral back that was completely removed with biopsy.  Mildly dysplastic nevus on the right abdomen - removed with biopsy on 24.       Has fair skin.   Had sunburns growing up.   Did tanning bed in HS.      Review of Systems:  Gen: Feels well, good sense of health.    Past Medical History, Family History, Surgical History, Medications and Allergies reviewed.    Past Medical History:   Diagnosis Date    Infertility, female      Past Surgical History:   Procedure Laterality Date     SECTION, LOW TRANSVERSE  2014    WISDOM TOOTH EXTRACTION         No Known Allergies  Outpatient Medications Marked as Taking for the 25 encounter (Office Visit) with Ethan Flores MD   Medication Sig Dispense Refill    Ivermectin (SOOLANTRA) 1 % CREA Apply to the face once daily for rosacea. 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      Has 2 kids that go to [employer].      Physical Examination       Well appearing.    1.  Forehead and medial cheeks with several to multiple erythematous papules and moderate erythema.     2.  Trunk and extremities with multiple well defined round to oval smooth brown macules and papules.     3.  Trunk, left cheek with few stuck on appearing verrucous tan papules and plaques.          Assessment and Plan     1. Rosacea -moderate papulopustular and erythematotelangiectatic    Start ivermectin 1% cream once

## 2025-05-26 ENCOUNTER — PATIENT MESSAGE (OUTPATIENT)
Age: 40
End: 2025-05-26

## 2025-05-27 RX ORDER — CLOBETASOL PROPIONATE 0.5 MG/G
CREAM TOPICAL
Qty: 45 G | Refills: 1 | Status: SHIPPED | OUTPATIENT
Start: 2025-05-27